# Patient Record
Sex: FEMALE | Race: WHITE | NOT HISPANIC OR LATINO | URBAN - METROPOLITAN AREA
[De-identification: names, ages, dates, MRNs, and addresses within clinical notes are randomized per-mention and may not be internally consistent; named-entity substitution may affect disease eponyms.]

---

## 2017-02-16 ENCOUNTER — OFFICE VISIT (OUTPATIENT)
Dept: RETAIL CLINIC | Facility: CLINIC | Age: 32
End: 2017-02-16

## 2017-02-16 VITALS
RESPIRATION RATE: 16 BRPM | OXYGEN SATURATION: 98 % | TEMPERATURE: 98.5 F | DIASTOLIC BLOOD PRESSURE: 66 MMHG | SYSTOLIC BLOOD PRESSURE: 106 MMHG | HEART RATE: 73 BPM

## 2017-02-16 DIAGNOSIS — J40 BRONCHITIS: Primary | ICD-10-CM

## 2017-02-16 DIAGNOSIS — J06.9 VIRAL UPPER RESPIRATORY TRACT INFECTION: ICD-10-CM

## 2017-02-16 PROBLEM — R09.89 CHEST CONGESTION: Status: ACTIVE | Noted: 2017-02-16

## 2017-02-16 PROBLEM — R05.9 COUGH: Status: ACTIVE | Noted: 2017-02-16

## 2017-02-16 PROCEDURE — 99213 OFFICE O/P EST LOW 20 MIN: CPT | Performed by: NURSE PRACTITIONER

## 2017-02-16 RX ORDER — PREDNISONE 10 MG/1
TABLET ORAL
Qty: 21 TABLET | Refills: 0 | Status: SHIPPED | OUTPATIENT
Start: 2017-02-16

## 2017-02-16 RX ORDER — CETIRIZINE HYDROCHLORIDE 10 MG/1
10 TABLET ORAL DAILY
COMMUNITY

## 2017-02-16 RX ORDER — BENZONATATE 100 MG/1
100 CAPSULE ORAL 3 TIMES DAILY PRN
Qty: 21 CAPSULE | Refills: 0 | Status: SHIPPED | OUTPATIENT
Start: 2017-02-16 | End: 2017-02-23

## 2017-02-16 RX ORDER — FLUTICASONE PROPIONATE 50 MCG
2 SPRAY, SUSPENSION (ML) NASAL DAILY
Qty: 1 BOTTLE | Refills: 0 | Status: SHIPPED | OUTPATIENT
Start: 2017-02-16

## 2023-07-25 ENCOUNTER — OFFICE VISIT (OUTPATIENT)
Dept: FAMILY MEDICINE CLINIC | Facility: CLINIC | Age: 38
End: 2023-07-25
Payer: COMMERCIAL

## 2023-07-25 VITALS
HEART RATE: 88 BPM | HEIGHT: 63 IN | BODY MASS INDEX: 41.99 KG/M2 | SYSTOLIC BLOOD PRESSURE: 142 MMHG | OXYGEN SATURATION: 99 % | WEIGHT: 237 LBS | DIASTOLIC BLOOD PRESSURE: 84 MMHG | TEMPERATURE: 98.8 F

## 2023-07-25 DIAGNOSIS — E66.01 MORBID (SEVERE) OBESITY DUE TO EXCESS CALORIES: Primary | ICD-10-CM

## 2023-07-25 PROBLEM — J30.2 SEASONAL ALLERGIES: Status: ACTIVE | Noted: 2021-01-04

## 2023-07-25 PROCEDURE — 99203 OFFICE O/P NEW LOW 30 MIN: CPT | Performed by: FAMILY MEDICINE

## 2023-07-25 RX ORDER — PHENTERMINE AND TOPIRAMATE 3.75; 23 MG/1; MG/1
1 CAPSULE, EXTENDED RELEASE ORAL EVERY MORNING
Qty: 14 CAPSULE | Refills: 0 | Status: SHIPPED | OUTPATIENT
Start: 2023-07-25 | End: 2023-07-28 | Stop reason: SDUPTHER

## 2023-07-25 RX ORDER — PHENTERMINE AND TOPIRAMATE 7.5; 46 MG/1; MG/1
1 CAPSULE, EXTENDED RELEASE ORAL EVERY MORNING
Qty: 30 CAPSULE | Refills: 2 | Status: SHIPPED | OUTPATIENT
Start: 2023-07-25 | End: 2023-07-28 | Stop reason: SDUPTHER

## 2023-07-28 ENCOUNTER — TELEPHONE (OUTPATIENT)
Dept: FAMILY MEDICINE CLINIC | Facility: CLINIC | Age: 38
End: 2023-07-28

## 2023-07-28 DIAGNOSIS — E66.01 MORBID (SEVERE) OBESITY DUE TO EXCESS CALORIES: ICD-10-CM

## 2023-07-30 RX ORDER — PHENTERMINE AND TOPIRAMATE 3.75; 23 MG/1; MG/1
1 CAPSULE, EXTENDED RELEASE ORAL EVERY MORNING
Qty: 14 CAPSULE | Refills: 0 | Status: SHIPPED | OUTPATIENT
Start: 2023-07-30 | End: 2023-11-07 | Stop reason: DRUGHIGH

## 2023-07-30 RX ORDER — PHENTERMINE AND TOPIRAMATE 7.5; 46 MG/1; MG/1
1 CAPSULE, EXTENDED RELEASE ORAL EVERY MORNING
Qty: 30 CAPSULE | Refills: 2 | Status: SHIPPED | OUTPATIENT
Start: 2023-07-30 | End: 2023-11-07 | Stop reason: SDUPTHER

## 2023-11-07 ENCOUNTER — OFFICE VISIT (OUTPATIENT)
Dept: FAMILY MEDICINE CLINIC | Facility: CLINIC | Age: 38
End: 2023-11-07
Payer: COMMERCIAL

## 2023-11-07 VITALS
DIASTOLIC BLOOD PRESSURE: 85 MMHG | WEIGHT: 217 LBS | SYSTOLIC BLOOD PRESSURE: 133 MMHG | TEMPERATURE: 98.4 F | OXYGEN SATURATION: 99 % | HEIGHT: 63 IN | BODY MASS INDEX: 38.45 KG/M2 | RESPIRATION RATE: 16 BRPM | HEART RATE: 85 BPM

## 2023-11-07 DIAGNOSIS — E66.9 OBESITY (BMI 35.0-39.9 WITHOUT COMORBIDITY): ICD-10-CM

## 2023-11-07 PROBLEM — E66.01 MORBID (SEVERE) OBESITY DUE TO EXCESS CALORIES: Status: ACTIVE | Noted: 2023-11-07

## 2023-11-07 PROBLEM — E66.01 MORBID (SEVERE) OBESITY DUE TO EXCESS CALORIES: Status: RESOLVED | Noted: 2023-11-07 | Resolved: 2023-11-07

## 2023-11-07 PROCEDURE — 99213 OFFICE O/P EST LOW 20 MIN: CPT | Performed by: FAMILY MEDICINE

## 2023-11-07 RX ORDER — PHENTERMINE AND TOPIRAMATE 7.5; 46 MG/1; MG/1
1 CAPSULE, EXTENDED RELEASE ORAL EVERY MORNING
Qty: 30 CAPSULE | Refills: 2 | Status: SHIPPED | OUTPATIENT
Start: 2023-11-07

## 2024-02-05 ENCOUNTER — OFFICE VISIT (OUTPATIENT)
Dept: FAMILY MEDICINE CLINIC | Facility: CLINIC | Age: 39
End: 2024-02-05
Payer: COMMERCIAL

## 2024-02-05 VITALS
HEART RATE: 65 BPM | BODY MASS INDEX: 38.27 KG/M2 | HEIGHT: 63 IN | OXYGEN SATURATION: 100 % | WEIGHT: 216 LBS | DIASTOLIC BLOOD PRESSURE: 82 MMHG | SYSTOLIC BLOOD PRESSURE: 117 MMHG

## 2024-02-05 DIAGNOSIS — R45.4 IRRITABILITY: ICD-10-CM

## 2024-02-05 DIAGNOSIS — E66.9 OBESITY (BMI 30-39.9): Primary | ICD-10-CM

## 2024-02-05 PROBLEM — R05.9 COUGH: Status: RESOLVED | Noted: 2017-02-16 | Resolved: 2024-02-05

## 2024-02-05 PROBLEM — R09.89 CHEST CONGESTION: Status: RESOLVED | Noted: 2017-02-16 | Resolved: 2024-02-05

## 2024-02-05 PROCEDURE — 99214 OFFICE O/P EST MOD 30 MIN: CPT | Performed by: FAMILY MEDICINE

## 2024-02-05 RX ORDER — ESCITALOPRAM OXALATE 10 MG/1
10 TABLET ORAL DAILY
Qty: 90 TABLET | Refills: 1 | Status: SHIPPED | OUTPATIENT
Start: 2024-02-05

## 2024-02-05 RX ORDER — PHENTERMINE AND TOPIRAMATE 11.25; 69 MG/1; MG/1
1 CAPSULE, EXTENDED RELEASE ORAL EVERY MORNING
Qty: 30 CAPSULE | Refills: 2 | Status: SHIPPED | OUTPATIENT
Start: 2024-02-05

## 2024-05-06 ENCOUNTER — TELEPHONE (OUTPATIENT)
Dept: FAMILY MEDICINE CLINIC | Facility: CLINIC | Age: 39
End: 2024-05-06
Payer: COMMERCIAL

## 2024-05-08 DIAGNOSIS — S92.155S CLOSED NONDISPLACED AVULSION FRACTURE OF LEFT TALUS, SEQUELA: Primary | ICD-10-CM

## 2024-05-14 ENCOUNTER — OFFICE VISIT (OUTPATIENT)
Dept: FAMILY MEDICINE CLINIC | Facility: CLINIC | Age: 39
End: 2024-05-14
Payer: COMMERCIAL

## 2024-05-14 VITALS
HEART RATE: 69 BPM | BODY MASS INDEX: 37.53 KG/M2 | SYSTOLIC BLOOD PRESSURE: 123 MMHG | HEIGHT: 63 IN | DIASTOLIC BLOOD PRESSURE: 84 MMHG | OXYGEN SATURATION: 99 % | WEIGHT: 211.8 LBS

## 2024-05-14 DIAGNOSIS — E66.9 OBESITY (BMI 30-39.9): ICD-10-CM

## 2024-05-14 PROCEDURE — 99213 OFFICE O/P EST LOW 20 MIN: CPT | Performed by: FAMILY MEDICINE

## 2024-05-14 RX ORDER — MULTIPLE VITAMINS W/ MINERALS TAB 9MG-400MCG
1 TAB ORAL DAILY
COMMUNITY

## 2024-05-14 RX ORDER — PHENTERMINE AND TOPIRAMATE 11.25; 69 MG/1; MG/1
1 CAPSULE, EXTENDED RELEASE ORAL EVERY MORNING
Qty: 30 CAPSULE | Refills: 2 | Status: SHIPPED | OUTPATIENT
Start: 2024-05-14

## 2024-05-15 LAB
AMPHETAMINES UR QL SCN: NEGATIVE NG/ML
BARBITURATES UR QL SCN: NEGATIVE NG/ML
BENZODIAZ UR QL SCN: NEGATIVE NG/ML
BZE UR QL SCN: NEGATIVE NG/ML
CANNABINOIDS UR QL SCN: NEGATIVE NG/ML
CREAT UR-MCNC: 39.5 MG/DL (ref 20–300)
LABORATORY COMMENT REPORT: NORMAL
METHADONE UR QL SCN: NEGATIVE NG/ML
OPIATES UR QL SCN: NEGATIVE NG/ML
OXYCODONE+OXYMORPHONE UR QL SCN: NEGATIVE NG/ML
PCP UR QL: NEGATIVE NG/ML
PH UR: 6.8 [PH] (ref 4.5–8.9)
PROPOXYPH UR QL SCN: NEGATIVE NG/ML

## 2024-07-27 DIAGNOSIS — R45.4 IRRITABILITY: ICD-10-CM

## 2024-07-28 RX ORDER — ESCITALOPRAM OXALATE 10 MG/1
10 TABLET ORAL DAILY
Qty: 90 TABLET | Refills: 1 | Status: SHIPPED | OUTPATIENT
Start: 2024-07-28

## 2024-08-13 ENCOUNTER — OFFICE VISIT (OUTPATIENT)
Dept: FAMILY MEDICINE CLINIC | Facility: CLINIC | Age: 39
End: 2024-08-13
Payer: COMMERCIAL

## 2024-08-13 VITALS
DIASTOLIC BLOOD PRESSURE: 80 MMHG | HEART RATE: 76 BPM | WEIGHT: 213.4 LBS | OXYGEN SATURATION: 100 % | BODY MASS INDEX: 37.8 KG/M2 | TEMPERATURE: 97.8 F | SYSTOLIC BLOOD PRESSURE: 120 MMHG

## 2024-08-13 DIAGNOSIS — E66.9 OBESITY (BMI 30-39.9): Primary | ICD-10-CM

## 2024-08-13 DIAGNOSIS — R45.4 IRRITABILITY: ICD-10-CM

## 2024-08-13 PROCEDURE — 99213 OFFICE O/P EST LOW 20 MIN: CPT | Performed by: FAMILY MEDICINE

## 2024-08-13 RX ORDER — PHENTERMINE AND TOPIRAMATE 15; 92 MG/1; MG/1
1 CAPSULE, EXTENDED RELEASE ORAL EVERY MORNING
Qty: 30 CAPSULE | Refills: 5 | Status: SHIPPED | OUTPATIENT
Start: 2024-08-13

## 2024-08-29 ENCOUNTER — TELEPHONE (OUTPATIENT)
Dept: FAMILY MEDICINE CLINIC | Facility: CLINIC | Age: 39
End: 2024-08-29

## 2024-11-15 ENCOUNTER — PATIENT MESSAGE (OUTPATIENT)
Dept: FAMILY MEDICINE CLINIC | Facility: CLINIC | Age: 39
End: 2024-11-15

## 2024-11-15 ENCOUNTER — OFFICE VISIT (OUTPATIENT)
Dept: FAMILY MEDICINE CLINIC | Facility: CLINIC | Age: 39
End: 2024-11-15
Payer: COMMERCIAL

## 2024-11-15 VITALS
RESPIRATION RATE: 16 BRPM | OXYGEN SATURATION: 99 % | DIASTOLIC BLOOD PRESSURE: 82 MMHG | HEIGHT: 63 IN | SYSTOLIC BLOOD PRESSURE: 112 MMHG | WEIGHT: 214.5 LBS | HEART RATE: 87 BPM | BODY MASS INDEX: 38.01 KG/M2

## 2024-11-15 DIAGNOSIS — M25.512 CHRONIC PAIN OF BOTH SHOULDERS: ICD-10-CM

## 2024-11-15 DIAGNOSIS — E66.9 OBESITY (BMI 30-39.9): Primary | ICD-10-CM

## 2024-11-15 DIAGNOSIS — G89.29 CHRONIC PAIN OF BOTH SHOULDERS: ICD-10-CM

## 2024-11-15 DIAGNOSIS — M25.511 CHRONIC PAIN OF BOTH SHOULDERS: ICD-10-CM

## 2024-11-15 DIAGNOSIS — F41.9 ANXIETY: ICD-10-CM

## 2024-11-15 PROCEDURE — 99214 OFFICE O/P EST MOD 30 MIN: CPT | Performed by: FAMILY MEDICINE

## 2024-11-15 RX ORDER — SEMAGLUTIDE 0.25 MG/.5ML
0.25 INJECTION, SOLUTION SUBCUTANEOUS WEEKLY
Qty: 2 ML | Refills: 0 | Status: SHIPPED | OUTPATIENT
Start: 2024-11-15

## 2024-11-15 NOTE — ASSESSMENT & PLAN NOTE
Nutrition Assessment: 
 
RECOMMENDATIONS/INTERVENTION(S):  
1. Recommend Consistent CHO diet. 2. Will order Ensure Clear ( apple) with meals. 3. Monitor PO intakes of meals / ONS, follow up with acceptance of ONS and possible additional supplements to further increase pt's kcal/pro intake. 4.  Monitor labs, weight, GI/diarrhea. ASSESSMENT:  
3/7: 74 yo male admitted for syncope. MD consult for general nutrition management. PMHx: prostate CA with mets to colon s/p colostomy, DM, hydronephrosis with nephrostomy tube. Class I obese per BMI. Pt has had 42lb weight loss over last 10 months (15%). Weight has fluctuated recently per weight hx, hard to determine recent weight loss. Pt has had decreased PO intakes at home for several months. C/o persistent diarrhea. Cardiac diet currently ordered. Pt very drowsy at time of visit, unable to obtain detailed information at this time. Would only answer my questions with yes or no. States he has not had anything to eat today secondary to not being hungry. States he does like juice. Will try Ensure Clear for some additional kcals/pro and to not exacerbate diarrhea. and monitor pt's intakes. Labs: . Meds: Humalog. D5 1/2 NaCl with 40mEqKcl running at 100ml/hr. Patient meets criteria for Severe Protein Calorie Malnutrition as evidenced by:  
ASPEN Malnutrition Criteria Acute Illness, Chronic Illness, or Social/Enviornmental: Chronic illness Energy Intake: Less than/equal to 75% of est energy req for greater than/equal to 1 month Weight Loss: 10% x 6 mos Body Fat: Mild Muscle Mass: Mild ASPEN Malnutrition Score - Chronic Illness: 9 Chronic Illness - Malnutrition Diagnosis: Severe malnutrition. Diet Order: Cardiac 
% Eaten:  No data found. Pertinent Medications: [x] Reviewed Labs: [x] Reviewed Anthropometrics: Height: 5' 9\" (175.3 cm) Weight: 99.8 kg (220 lb)  IBW (%IBW):   ( ) UBW (%UBW):   (  %)   
 
 Possibly a medication side effect.    BMI: Body mass index is 32.49 kg/m². This BMI is indicative of: 
 [] Underweight    [] Normal    [] Overweight    [x] Class I Obesity    []  Extreme Obesity (BMI>40) Estimated Nutrition Needs (Based on): 4589 Kcals/day(BMR 1722 x AF 1.3) , 80 g(80-100gm (0.8-1gm/kg/d)) Protein Carbohydrate: At Least 130 g/day  Fluids: 2239 mL/day (1 ml/kcal) Last BM:    []Active     [x]Hyperactive  []Hypoactive       [] Absent   BS Skin:    [] Intact   [] Incision  [] Breakdown   [] DTI   [] Tears/Excoriation/Abrasion  []Edema [] Other: Wt Readings from Last 30 Encounters:  
04/05/19 99.8 kg (220 lb)  
03/26/19 99.8 kg (220 lb) 03/07/19 105.2 kg (232 lb) 02/07/19 102.5 kg (226 lb) 02/01/19 99.5 kg (219 lb 5.7 oz) 01/11/19 97.7 kg (215 lb 6.4 oz) 12/20/18 93.9 kg (207 lb) 12/07/18 96.3 kg (212 lb 3.2 oz) 09/17/18 99.4 kg (219 lb 3.2 oz) 09/06/18 98 kg (216 lb)  
06/14/18 118.8 kg (262 lb) NUTRITION DIAGNOSES:  
Problem:  Inadequate protein-energy intake Etiology: related to decreased ability to consume sufficient energy Signs/Symptoms: as evidenced by Po intakes < EEN's NUTRITION INTERVENTIONS: 
Meals/Snacks: General/healthful diet   Supplements: Commercial supplement GOAL:  
Consume > 50% all meals + ONS within next 1-3 days Cultural, Lutheran, or Ethnic Dietary Needs: None EDUCATION & DISCHARGE NEEDS:  
 [x] None Identified 
 [] Identified and Education Provided/Documented 
 [] Identified and Pt declined/was not appropriate [x] Interdisciplinary Care Plan Reviewed/Documented  
 [x] Discharge Needs: Follow Consistent Cho diet with ONS 3x/day 
 [] No Nutrition Related Discharge Needs NUTRITION RISK:  
Pt Is At Nutrition Risk  [x] No Nutrition Risk Identified  [] PT SEEN FOR:  
 [x]  MD Consult: []Calorie Count []Diabetic Diet Education []Diet Education []Electrolyte Management 
   [x]General Nutrition Management and Supplements []Management of Tube Feeding []TPN Recommendations []  RN Referral:  []MST score >=2 
   []Enteral/Parenteral Nutrition PTA []Pregnant: Gestational DM or Multigestation  
              [] Pressure Ulcer 
 
[]  Low BMI      []  Length of Stay       [] Dysphagia Diet         [] Ventilator 
[]  Follow-up Previous Recommendations: 
 [] Implemented          [] Not Implemented          [x] Not Applicable Previous Goal: 
 [] Met              [] Progressing Towards Goal              [] Not Progressing Towards Goal   [x] Not Applicable Veverly DARON Lundberg Pager [phone] Phone [phone]

## 2024-11-15 NOTE — PATIENT INSTRUCTIONS
We decided to try Wegovy.  If you can get it and can tolerate it, after a couple of weeks, send a request for the next dose.  We'll go up monthly until we max it out or you can't tolerate the medication.      Keep working out and your weight loss efforts.  You have done a good job maintaining a 20# weight loss.     If it's not covered, we'll try Contrave through [place] [facility].    I do think that the anxiety is likely related to the high dose of stimulant and the Qsymia but if coming off of it does not help quite a bit we can always consider doing a trial on an anxiety medication like Lexapro.     You can just stop Qsymia.     I placed an order for physical therapy and they should call in the next week or so.

## 2024-11-15 NOTE — PROGRESS NOTES
"Subjective     Perla Casarez is a 39 y.o. female who presents with   Chief Complaint   Patient presents with    Weight Check       History of Present Illness     She's salomón on Qsymia for a while and it's not working anymore.  She initially lost about 20# and has bounced around a couple pounds for the past year.  She's exercising regularly and working on her diet and just not making progress and still wants to lose about 40 more pounds.  She's also having a lot more anxiety with this dose.  She does have a history of Lexapro use for depression many years ago.     She is having anterior shoulder pain for the past month.  She's been using a new pillow at night with some help and now the left one is hurting.   She exacerbated the pain since slipping with rock climbing and jarring her shoulders.  She has been having the issue for several months at night with sleeping prior to this.              Review of Systems     Objective     /82 (BP Location: Left arm, Patient Position: Sitting, Cuff Size: Adult)   Pulse 87   Resp 16   Ht 160 cm (63\")   Wt 97.3 kg (214 lb 8 oz)   SpO2 99%   Breastfeeding No   BMI 38.00 kg/m²     Physical Exam  Constitutional:       Appearance: Normal appearance.   Musculoskeletal:      Comments: Painful range of motion with a pop of the left and tightness on the right with flexion.    Neurological:      Mental Status: She is alert.   Psychiatric:         Behavior: Behavior normal.         Thought Content: Thought content normal.         Procedures     Assessment & Plan   Diagnoses and all orders for this visit:    1. Obesity (BMI 30-39.9) (Primary)  -     Semaglutide-Weight Management (Wegovy) 0.25 MG/0.5ML solution auto-injector; Inject 0.5 mL under the skin into the appropriate area as directed 1 (One) Time Per Week.  Dispense: 2 mL; Refill: 0    2. Chronic pain of both shoulders  -     Ambulatory Referral to Physical Therapy for Evaluation & Treatment    3. Anxiety  Assessment & " Plan:  Possibly a medication side effect.                Discussion    Patient Instructions   We decided to try Wegovy.  If you can get it and can tolerate it, after a couple of weeks, send a request for the next dose.  We'll go up monthly until we max it out or you can't tolerate the medication.      Keep working out and your weight loss efforts.  You have done a good job maintaining a 20# weight loss.     If it's not covered, we'll try Contrave through [place] [facility].    I do think that the anxiety is likely related to the high dose of stimulant and the Qsymia but if coming off of it does not help quite a bit we can always consider doing a trial on an anxiety medication like Lexapro.     You can just stop Qsymia.     I placed an order for physical therapy and they should call in the next week or so.                 Irma Graves MD

## 2024-12-12 ENCOUNTER — TREATMENT (OUTPATIENT)
Dept: PHYSICAL THERAPY | Facility: CLINIC | Age: 39
End: 2024-12-12
Payer: COMMERCIAL

## 2024-12-12 DIAGNOSIS — M25.511 CHRONIC PAIN OF BOTH SHOULDERS: Primary | ICD-10-CM

## 2024-12-12 DIAGNOSIS — G89.29 CHRONIC PAIN OF BOTH SHOULDERS: Primary | ICD-10-CM

## 2024-12-12 DIAGNOSIS — M25.512 CHRONIC PAIN OF BOTH SHOULDERS: Primary | ICD-10-CM

## 2024-12-12 DIAGNOSIS — R29.898 DECREASED STRENGTH OF UPPER EXTREMITY: ICD-10-CM

## 2024-12-12 PROCEDURE — 97530 THERAPEUTIC ACTIVITIES: CPT

## 2024-12-12 PROCEDURE — 97110 THERAPEUTIC EXERCISES: CPT

## 2024-12-12 PROCEDURE — 97161 PT EVAL LOW COMPLEX 20 MIN: CPT

## 2024-12-12 NOTE — PROGRESS NOTES
Physical Therapy Initial Evaluation and Plan of Care                                               [address], [address]                                                           [place], [place]                                                         [phone]    Patient: Perla Casarez   : 1985  Diagnosis/ICD-10 Code:  Chronic pain of both shoulders [M25.511, G89.29, M25.512]  Referring practitioner: Irma Graves*  Date of Initial Visit: 2024  Today's Date: 2024  Patient seen for 1 sessions           Subjective Questionnaire: QuickDASH: 25      Subjective Evaluation    History of Present Illness  Mechanism of injury: Pt reports onset of R shoulder pain about 2 months ago. She first noticed the pain when trying to fall asleep at night. She enjoys sleeping with her R arm up by her head, usually outstretched. This position began to feel uncomfortable and achy. The pain is felt along her anterior shoulder, just inferior to her AC joint. On occasion, the pain will radiate superiorly and posteriorly across the top of her shoulder joint. She also has difficulty with overhead reaching and lifting.     The pain began to resolve on its own until she slipped while rock climbing. She managed to catch herself with both of her arms above her head. This worsened her pain and she now experiences similar symptoms in her left shoulder as well.     Pt is very active and exercises at the gym. She states that she is not able to increase the weight used due to increased pain in bilateral shoulders. She reports the most discomfort with bench press, overhead press, and flies.     She denies prior injury to either UE.       Patient Occupation: teacher Quality of life: good    Pain  Current pain ratin  At worst pain ratin  Location: anterior bilateral shoulders  Quality: sharp and dull ache  Relieving factors: medications  Aggravating factors: overhead activity, lifting and  sleeping  Progression: no change    Hand dominance: right    Patient Goals  Patient goals for therapy: decreased pain, increased motion, return to sport/leisure activities and increased strength  Patient goal: rock climbing, increase weight at the gym           Objective          Postural Observations  Seated posture: good  Standing posture: good      Palpation     Additional Palpation Details  No TTP    Tenderness     Additional Tenderness Details  No TTP    Cervical/Thoracic Screen   Cervical range of motion within normal limits    Neurological Testing     Sensation     Shoulder   Left Shoulder   Intact: light touch    Right Shoulder   Intact: Light touch    Active Range of Motion   Left Shoulder   Normal active range of motion  Flexion: 180 degrees   Abduction: 180 degrees   External rotation BTH: Active external rotation behind the head: to C7.   Internal rotation BTB: Active internal rotation behind the back: upper lumbar spine.     Right Shoulder   Normal active range of motion  Flexion: 180 degrees   Abduction: 180 degrees with pain  External rotation BTH: Active external rotation behind the head: to C7. with pain  Internal rotation BTB: Active internal rotation behind the back: upper lumbar spine.     Strength/Myotome Testing     Left Shoulder     Planes of Motion   Flexion: 5   Extension: 5   Abduction: 5   External rotation at 0°: 5   Internal rotation at 0°: 5     Right Shoulder     Planes of Motion   Flexion: 4+   Extension: 5   Abduction: 4+   External rotation at 0°: 4+   Internal rotation at 0°: 4+     Additional Strength Details  Prone MMT:    Left:   Rhomboid 4-/5  Lower trap: 4-/5    Right:   Rhomboid 4-/5  Lower trap: 4-/5    Tests     Left Shoulder   Negative crossover, empty can, Hawkin's, lift-off, Neer's and painful arc.     Right Shoulder   Positive crossover and Hawkin's.   Negative empty can, lift-off, Neer's and painful arc.           Assessment & Plan       Assessment  Impairments:  abnormal or restricted ROM, activity intolerance, impaired physical strength, lacks appropriate home exercise program and pain with function   Functional limitations: carrying objects, lifting, sleeping and reaching overhead   Assessment details: Perla is a 40 y/o female reporting to OP PT for initial evaluation regarding bilateral shoulder pain which has been present for about 2 months without known injury or trauma. The pain began in her R shoulder and was first noticed when lying down to sleep at night. She prefers to sleep with her R arm flexed upward by her head. This position causes increased stiffness and aching felt anteriorly in her R shoulder. She experiences similar symptoms in her L shoulder. Due to stiffness and achiness, she has difficulty with lifting and reaching overhead. Perla feels that her weight training at the gym is affected by her bilateral shoulder pain as well because she has not been able to progress her weight. Upon evaluation, she presents with deficits in bilateral shoulder strength, right worse than left. She was not able to perform a behind the back lift-off test due to bilateral shoulder weakness. She had positive impingement signs bilaterally with crossover and Hawkin's testing. Skilled OP PT is deemed reasonable and necessary in order to address these deficits, limitations, and impairments and assist with return to prior level of function.   Prognosis: good    Goals  Plan Goals: Short Term: 3 weeks  1. Pt will be independent and compliant with initial HEP  2. Pt will report 50% improvement in bilateral shoulder pain  3. Pt will tolerate progressive strengthening for her scapulothoracic musculature without reports of shoulder pain     Long Term: 6 weeks  1. Pt will report ability to incorporate trunk strengthening into her routine at the gym   2. Pt will report 0-2/10 pain in bilateral shoulders  3. Pt will report improved ability to fall asleep at night with less pain in  bilateral shoulders for improved quality of life    Plan  Therapy options: will be seen for skilled therapy services  Planned modality interventions: cryotherapy and thermotherapy (hydrocollator packs)  Planned therapy interventions: body mechanics training, functional ROM exercises, home exercise program, therapeutic activities, stretching, strengthening, neuromuscular re-education and postural training  Frequency: 1x week  Duration in weeks: 4  Treatment plan discussed with: patient        Manual Therapy:    0     mins  68358;  Therapeutic Exercise:    10     mins  57181;     Neuromuscular Mariola:    0    mins  34179;    Therapeutic Activity:     10     mins  56590;     Gait Trainin     mins  25029;     Ultrasound:     0     mins  49684;    Electrical Stimulation:    0     mins  95300 ( );  Dry Needling     0     mins self-pay    Timed Treatment:   20   mins   Total Treatment:     42   mins    PT SIGNATURE: Agusto Avila PT, DPT  [place] License #: [id number]   Agusto Avila PT, 24, 4:03 PM EST  DATE TREATMENT INITIATED: 2024    Initial Certification  Certification Period: 3/12/2025  I certify that the therapy services are furnished while this patient is under my care.  The services outlined above are required by this patient, and will be reviewed every 90 days.     PHYSICIAN: Irma Graves MD      DATE:     Please sign and return via fax to [phone].. Thank you, [facility].

## 2024-12-19 ENCOUNTER — TREATMENT (OUTPATIENT)
Dept: PHYSICAL THERAPY | Facility: CLINIC | Age: 39
End: 2024-12-19
Payer: COMMERCIAL

## 2024-12-19 DIAGNOSIS — M25.511 CHRONIC PAIN OF BOTH SHOULDERS: Primary | ICD-10-CM

## 2024-12-19 DIAGNOSIS — G89.29 CHRONIC PAIN OF BOTH SHOULDERS: Primary | ICD-10-CM

## 2024-12-19 DIAGNOSIS — R29.898 DECREASED STRENGTH OF UPPER EXTREMITY: ICD-10-CM

## 2024-12-19 DIAGNOSIS — M25.512 CHRONIC PAIN OF BOTH SHOULDERS: Primary | ICD-10-CM

## 2024-12-19 PROCEDURE — 97112 NEUROMUSCULAR REEDUCATION: CPT

## 2024-12-19 PROCEDURE — 97110 THERAPEUTIC EXERCISES: CPT

## 2024-12-19 NOTE — PROGRESS NOTES
Physical Therapy Daily Progress Note                                            [address], [address]                                                           [place], [place]                                                         [phone]    Patient: Perla Casarez   : 1985  Diagnosis/ICD-10 Code:  Chronic pain of both shoulders [M25.511, G89.29, M25.512]  Referring practitioner: Irma Graves*  Date of Initial Visit: Type: THERAPY  Noted: 2024  Today's Date: 2024  Patient seen for 2 sessions           Subjective Evaluation    History of Present Illness    Subjective comment: Perla reports continued, slow improvement in her R shoulder pain. She has been very consistent with her HEP       Objective   See Exercise, Manual, and Modality Logs for complete treatment.       Assessment & Plan       Assessment  Assessment details: Due to Perla's great progress and compliance to her HEP as well as demonstration of proper form with each exercise, she is appropriate to decrease frequency at this time. She is very active at the gym and would prefer to work on improving her scapulothoracic strength independently. She was able to perform increased repetitions of all initial exercises and was educated on additional strengthening and stretching activities. Latt pull downs and serratus punches were added to assist with further improving overall posture and bilateral shoulder stabilization. She was also educated on wall angels and pectoralis stretching in a doorway for improved shoulder mobility. We will re-assess in 2 weeks and progress exercises as appropriate.         Progress per Plan of Care           Manual Therapy:    0     mins  39825;  Therapeutic Exercise:    14     mins  48265;     Neuromuscular Mariola:    10    mins  42765;    Therapeutic Activity:     0     mins  96383;     Gait Trainin     mins  89715;     Ultrasound:     0     mins  05524;    Electrical  Stimulation:    0     mins  62088 ( );  Dry Needling     0     mins self-pay    Timed Treatment:   24   mins   Total Treatment:     24   mins    Agusot Avila PT, DPT  Physical Therapist  [place] License #: [id number]  Agusto Avila PT, 12/19/24, 3:28 PM EST

## 2025-01-25 DIAGNOSIS — R45.4 IRRITABILITY: ICD-10-CM

## 2025-01-25 RX ORDER — ESCITALOPRAM OXALATE 10 MG/1
10 TABLET ORAL DAILY
Qty: 90 TABLET | Refills: 1 | Status: SHIPPED | OUTPATIENT
Start: 2025-01-25

## 2025-02-03 ENCOUNTER — DOCUMENTATION (OUTPATIENT)
Dept: PHYSICAL THERAPY | Facility: CLINIC | Age: 40
End: 2025-02-03
Payer: COMMERCIAL

## 2025-02-14 ENCOUNTER — OFFICE VISIT (OUTPATIENT)
Dept: FAMILY MEDICINE CLINIC | Facility: CLINIC | Age: 40
End: 2025-02-14
Payer: COMMERCIAL

## 2025-02-14 VITALS
HEART RATE: 71 BPM | DIASTOLIC BLOOD PRESSURE: 85 MMHG | BODY MASS INDEX: 39.83 KG/M2 | HEIGHT: 63 IN | OXYGEN SATURATION: 98 % | WEIGHT: 224.8 LBS | SYSTOLIC BLOOD PRESSURE: 123 MMHG

## 2025-02-14 DIAGNOSIS — Z23 ENCOUNTER FOR VACCINATION: ICD-10-CM

## 2025-02-14 DIAGNOSIS — F41.9 ANXIETY: Primary | ICD-10-CM

## 2025-02-14 DIAGNOSIS — E66.9 OBESITY (BMI 30-39.9): ICD-10-CM

## 2025-02-14 PROCEDURE — 99213 OFFICE O/P EST LOW 20 MIN: CPT | Performed by: FAMILY MEDICINE

## 2025-02-14 PROCEDURE — 90715 TDAP VACCINE 7 YRS/> IM: CPT | Performed by: FAMILY MEDICINE

## 2025-02-14 PROCEDURE — 90471 IMMUNIZATION ADMIN: CPT | Performed by: FAMILY MEDICINE

## 2025-02-14 NOTE — PATIENT INSTRUCTIONS
We are planning a follow up and labs on semaglutide in 6 months.  Hopefully you'll respond well to it.     We did your TDaP vaccine and that covers tetanus, diptheria and pertussis.

## 2025-02-14 NOTE — PROGRESS NOTES
"Subjective     Perla Casarez is a 39 y.o. female who presents with   Chief Complaint   Patient presents with    Obesity       Obesity  Weight Management  Weight:  Increased  Weight change (lbs):  5  Weight loss treatment:  Portion control, increasing exercise and prescription medications  Treatment barriers:  Adherence to diet  Physical activity tolerance:  Improved  Energy level:  Unchanged     She has tried Qsymia and most recently Contrave wasn't doing much and she ultimately gained weight.  She broke down and decided to try compounded semaglutide.  She's only on her second dose so she has to wait and see.     Generalized anxiety and on Lexapro 10mg and overall does well.  She'd like to continue.               Review of Systems     Objective     /85   Pulse 71   Ht 160 cm (63\")   Wt 102 kg (224 lb 12.8 oz)   SpO2 98%   Breastfeeding No   BMI 39.82 kg/m²     Physical Exam  Constitutional:       Appearance: Normal appearance.   Neurological:      Mental Status: She is alert.   Psychiatric:         Behavior: Behavior normal.         Thought Content: Thought content normal.         Procedures     Assessment & Plan   Diagnoses and all orders for this visit:    1. Anxiety (Primary)    2. Obesity (BMI 30-39.9)  Assessment & Plan:  Now on semaglutide.       3. Encounter for vaccination  -     Tdap Vaccine Greater Than or Equal To 6yo IM             Discussion    Patient Instructions   We are planning a follow up and labs on semaglutide in 6 months.  Hopefully you'll respond well to it.     We did your TDaP vaccine and that covers tetanus, diptheria and pertussis.               Irma Graves MD           "

## 2025-07-18 DIAGNOSIS — R45.4 IRRITABILITY: ICD-10-CM

## 2025-07-18 RX ORDER — ESCITALOPRAM OXALATE 10 MG/1
10 TABLET ORAL DAILY
Qty: 90 TABLET | Refills: 1 | Status: SHIPPED | OUTPATIENT
Start: 2025-07-18

## 2025-08-15 ENCOUNTER — OFFICE VISIT (OUTPATIENT)
Dept: FAMILY MEDICINE CLINIC | Facility: CLINIC | Age: 40
End: 2025-08-15
Payer: COMMERCIAL

## 2025-08-15 VITALS
SYSTOLIC BLOOD PRESSURE: 146 MMHG | HEIGHT: 63 IN | BODY MASS INDEX: 38.88 KG/M2 | TEMPERATURE: 98.4 F | WEIGHT: 219.4 LBS | DIASTOLIC BLOOD PRESSURE: 84 MMHG | OXYGEN SATURATION: 97 % | HEART RATE: 79 BPM

## 2025-08-15 DIAGNOSIS — F41.9 ANXIETY: Primary | ICD-10-CM

## 2025-08-15 DIAGNOSIS — E66.9 OBESITY (BMI 30-39.9): ICD-10-CM

## 2025-08-15 DIAGNOSIS — Z11.59 ENCOUNTER FOR HEPATITIS C SCREENING TEST FOR LOW RISK PATIENT: ICD-10-CM

## 2025-08-15 DIAGNOSIS — Z13.1 SCREENING FOR DIABETES MELLITUS (DM): ICD-10-CM

## 2025-08-15 DIAGNOSIS — Z13.220 NEED FOR LIPID SCREENING: ICD-10-CM

## 2025-08-15 PROCEDURE — 99214 OFFICE O/P EST MOD 30 MIN: CPT | Performed by: FAMILY MEDICINE

## 2025-08-15 RX ORDER — ESCITALOPRAM OXALATE 20 MG/1
20 TABLET ORAL DAILY
Qty: 90 TABLET | Refills: 1 | Status: SHIPPED | OUTPATIENT
Start: 2025-08-15

## 2025-08-16 LAB
ALBUMIN SERPL-MCNC: 4.3 G/DL (ref 3.9–4.9)
ALP SERPL-CCNC: 68 IU/L (ref 44–121)
ALT SERPL-CCNC: 12 IU/L (ref 0–32)
AST SERPL-CCNC: 20 IU/L (ref 0–40)
BASOPHILS # BLD AUTO: 0 X10E3/UL (ref 0–0.2)
BASOPHILS NFR BLD AUTO: 0 %
BILIRUB SERPL-MCNC: <0.2 MG/DL (ref 0–1.2)
BUN SERPL-MCNC: 12 MG/DL (ref 6–24)
BUN/CREAT SERPL: 14 (ref 9–23)
CALCIUM SERPL-MCNC: 9.8 MG/DL (ref 8.7–10.2)
CHLORIDE SERPL-SCNC: 99 MMOL/L (ref 96–106)
CHOLEST SERPL-MCNC: 191 MG/DL (ref 100–199)
CO2 SERPL-SCNC: 23 MMOL/L (ref 20–29)
CREAT SERPL-MCNC: 0.87 MG/DL (ref 0.57–1)
EGFRCR SERPLBLD CKD-EPI 2021: 86 ML/MIN/1.73
EOSINOPHIL # BLD AUTO: 0.1 X10E3/UL (ref 0–0.4)
EOSINOPHIL NFR BLD AUTO: 2 %
ERYTHROCYTE [DISTWIDTH] IN BLOOD BY AUTOMATED COUNT: 12.4 % (ref 11.7–15.4)
GLOBULIN SER CALC-MCNC: 2.5 G/DL (ref 1.5–4.5)
GLUCOSE SERPL-MCNC: 85 MG/DL (ref 70–99)
HBA1C MFR BLD: 5.2 % (ref 4.8–5.6)
HCT VFR BLD AUTO: 39.3 % (ref 34–46.6)
HCV IGG SERPL QL IA: NON REACTIVE
HDLC SERPL-MCNC: 64 MG/DL
HGB BLD-MCNC: 12.9 G/DL (ref 11.1–15.9)
IMM GRANULOCYTES # BLD AUTO: 0 X10E3/UL (ref 0–0.1)
IMM GRANULOCYTES NFR BLD AUTO: 0 %
LDLC SERPL CALC-MCNC: 112 MG/DL (ref 0–99)
LYMPHOCYTES # BLD AUTO: 1.5 X10E3/UL (ref 0.7–3.1)
LYMPHOCYTES NFR BLD AUTO: 20 %
MCH RBC QN AUTO: 31.5 PG (ref 26.6–33)
MCHC RBC AUTO-ENTMCNC: 32.8 G/DL (ref 31.5–35.7)
MCV RBC AUTO: 96 FL (ref 79–97)
MONOCYTES # BLD AUTO: 0.7 X10E3/UL (ref 0.1–0.9)
MONOCYTES NFR BLD AUTO: 10 %
NEUTROPHILS # BLD AUTO: 5.1 X10E3/UL (ref 1.4–7)
NEUTROPHILS NFR BLD AUTO: 68 %
PLATELET # BLD AUTO: 299 X10E3/UL (ref 150–450)
POTASSIUM SERPL-SCNC: 4.5 MMOL/L (ref 3.5–5.2)
PROT SERPL-MCNC: 6.8 G/DL (ref 6–8.5)
RBC # BLD AUTO: 4.09 X10E6/UL (ref 3.77–5.28)
SODIUM SERPL-SCNC: 138 MMOL/L (ref 134–144)
T4 FREE SERPL-MCNC: 1.08 NG/DL (ref 0.82–1.77)
TRIGL SERPL-MCNC: 83 MG/DL (ref 0–149)
TSH SERPL DL<=0.005 MIU/L-ACNC: 1.95 UIU/ML (ref 0.45–4.5)
VLDLC SERPL CALC-MCNC: 15 MG/DL (ref 5–40)
WBC # BLD AUTO: 7.5 X10E3/UL (ref 3.4–10.8)